# Patient Record
Sex: MALE | Race: WHITE | Employment: OTHER | RURAL
[De-identification: names, ages, dates, MRNs, and addresses within clinical notes are randomized per-mention and may not be internally consistent; named-entity substitution may affect disease eponyms.]

---

## 2021-11-15 ENCOUNTER — HOSPITAL ENCOUNTER (EMERGENCY)
Age: 56
Discharge: HOME | End: 2021-11-15
Attending: FAMILY MEDICINE | Admitting: FAMILY MEDICINE
Payer: COMMERCIAL

## 2021-11-15 VITALS
TEMPERATURE: 97.4 F | BODY MASS INDEX: 22.67 KG/M2 | SYSTOLIC BLOOD PRESSURE: 133 MMHG | HEART RATE: 92 BPM | RESPIRATION RATE: 17 BRPM | WEIGHT: 149.6 LBS | OXYGEN SATURATION: 99 % | DIASTOLIC BLOOD PRESSURE: 71 MMHG | HEIGHT: 68 IN

## 2021-11-15 DIAGNOSIS — T14.8XXA SKIN AVULSION: Primary | ICD-10-CM

## 2021-11-15 PROCEDURE — 99282 EMERGENCY DEPT VISIT SF MDM: CPT

## 2021-11-15 PROCEDURE — 77030022017 HC DRSG HEMO QCLOT ZMED -A

## 2021-11-15 RX ORDER — CEPHALEXIN 500 MG/1
500 CAPSULE ORAL 2 TIMES DAILY
COMMUNITY
Start: 2021-11-15 | End: 2021-11-22

## 2021-11-16 NOTE — ED NOTES
Neyda Oats applied to wound by MD - secured using tube gauze. Pt tolerated without incidence.  Pt holding direct pressure to wound per MD.

## 2021-11-16 NOTE — ED PROVIDER NOTES
HPI  Pt is a 63 yo man who comes to the ED because he has persistent bleeding from a deep abrasion/skin avulsion he sustained 2 days ago. He reports that he was \"shrink wrapping my boat,\" and as he reached for the wrap, he cut his finger on the ladder. He had a great deal of bleeding at first, and he was able to stop the bleeding with a tight bandage. This am, the bleeding persisted, so he went to his pcp, and she put another bandage on the wound. Tonight, he came to the ED because the bleeding started again. No past medical history on file. No past surgical history on file. No family history on file. Social History     Socioeconomic History    Marital status:      Spouse name: Not on file    Number of children: Not on file    Years of education: Not on file    Highest education level: Not on file   Occupational History    Not on file   Tobacco Use    Smoking status: Current Every Day Smoker     Packs/day: 0.50     Years: 30.00     Pack years: 15.00    Smokeless tobacco: Never Used   Vaping Use    Vaping Use: Never used   Substance and Sexual Activity    Alcohol use: Yes     Alcohol/week: 1.0 standard drink     Types: 1 Cans of beer per week    Drug use: Never    Sexual activity: Not on file   Other Topics Concern    Not on file   Social History Narrative    Not on file     Social Determinants of Health     Financial Resource Strain:     Difficulty of Paying Living Expenses: Not on file   Food Insecurity:     Worried About Running Out of Food in the Last Year: Not on file    Darline of Food in the Last Year: Not on file   Transportation Needs:     Lack of Transportation (Medical): Not on file    Lack of Transportation (Non-Medical):  Not on file   Physical Activity:     Days of Exercise per Week: Not on file    Minutes of Exercise per Session: Not on file   Stress:     Feeling of Stress : Not on file   Social Connections:     Frequency of Communication with Friends and Family: Not on file    Frequency of Social Gatherings with Friends and Family: Not on file    Attends Hindu Services: Not on file    Active Member of Clubs or Organizations: Not on file    Attends Club or Organization Meetings: Not on file    Marital Status: Not on file   Intimate Partner Violence:     Fear of Current or Ex-Partner: Not on file    Emotionally Abused: Not on file    Physically Abused: Not on file    Sexually Abused: Not on file   Housing Stability:     Unable to Pay for Housing in the Last Year: Not on file    Number of [place] in the Last Year: Not on file    Unstable Housing in the Last Year: Not on file         ALLERGIES: Patient has no known allergies. Review of Systems   Musculoskeletal: Negative for joint swelling. Skin: Positive for wound. Neurological: Negative for weakness, light-headedness and numbness. Vitals:    11/15/21 2258 11/15/21 2335   BP: 133/71    Pulse: 92    Resp: 17    Temp: 97.4 °F (36.3 °C)    SpO2: 99% 99%   Weight: 67.9 kg (149 lb 9.6 oz)    Height: 5' 8\" (1.727 m)             Physical Exam  Constitutional:       Appearance: Normal appearance. HENT:      Head: Normocephalic. Right Ear: External ear normal.      Left Ear: External ear normal.   Cardiovascular:      Rate and Rhythm: Normal rate and regular rhythm. Pulmonary:      Effort: Pulmonary effort is normal.   Musculoskeletal:         General: No tenderness. Normal range of motion. Skin:     Findings: Lesion present. Comments: On the volar aspect of the right ring finger across the PIP, there is a 1 x 3 cm deep abrasion/skin avulsion with bleeding, stopped with direct pressure. Neurological:      General: No focal deficit present. Mental Status: He is alert and oriented to person, place, and time. MDM  Number of Diagnoses or Management Options  Skin avulsion  Diagnosis management comments: Difficult wound to stop bleeding.  Will use Quik Clot and direct pressure, then a bulky protective bandage. Imp: Right long finger skin avulsion    Plan: Keep this bandage in place for another 24 hours            Instructions in wound care/hemostasis.             F/U pcp doroteo Mitchell MD

## 2021-11-16 NOTE — ED TRIAGE NOTES
Pt c/o bleeding from right 4th finger laceration. Seen at [facility] this morning. Bandage applied. Pt reports that he cut his finger on his boat on Saturday (on ladder on back of boat) and he has had bleeding intermittent since Saturday. Pt started on ABT today - Cephalexin 500 mg twice daily x 7 days.

## 2021-11-16 NOTE — DISCHARGE INSTRUCTIONS
--Leave this dressing in place until tomorrow evening. --If you have bleeding, apply direct pressure to the wound for 10 minutes and then wrap tape around that piece of gauze.

## 2024-10-07 ENCOUNTER — TRANSCRIBE ORDERS (OUTPATIENT)
Facility: HOSPITAL | Age: 59
End: 2024-10-07

## 2024-10-07 ENCOUNTER — HOSPITAL ENCOUNTER (OUTPATIENT)
Facility: HOSPITAL | Age: 59
Discharge: HOME | End: 2024-10-10
Payer: COMMERCIAL

## 2024-10-07 DIAGNOSIS — M25.512 LEFT SHOULDER PAIN, UNSPECIFIED CHRONICITY: ICD-10-CM

## 2024-10-07 DIAGNOSIS — M25.512 LEFT SHOULDER PAIN, UNSPECIFIED CHRONICITY: Primary | ICD-10-CM

## 2024-10-07 PROCEDURE — 73030 X-RAY EXAM OF SHOULDER: CPT
